# Patient Record
Sex: FEMALE | Race: BLACK OR AFRICAN AMERICAN | NOT HISPANIC OR LATINO | Employment: FULL TIME | URBAN - METROPOLITAN AREA
[De-identification: names, ages, dates, MRNs, and addresses within clinical notes are randomized per-mention and may not be internally consistent; named-entity substitution may affect disease eponyms.]

---

## 2017-05-19 LAB
B-HCG UR QL: NEGATIVE
BACTERIA #/AREA URNS HPF: ABNORMAL /HPF
BILIRUB UR QL STRIP: NEGATIVE
CLARITY UR: ABNORMAL
COLOR UR: YELLOW
CTP QC/QA: YES
GLUCOSE UR QL STRIP: NEGATIVE
HGB UR QL STRIP: ABNORMAL
HYALINE CASTS #/AREA URNS LPF: 0 /LPF
KETONES UR QL STRIP: NEGATIVE
LEUKOCYTE ESTERASE UR QL STRIP: ABNORMAL
MICROSCOPIC COMMENT: ABNORMAL
NITRITE UR QL STRIP: NEGATIVE
PH UR STRIP: 6 [PH] (ref 5–8)
PROT UR QL STRIP: ABNORMAL
RBC #/AREA URNS HPF: 7 /HPF (ref 0–4)
SP GR UR STRIP: 1.01 (ref 1–1.03)
URN SPEC COLLECT METH UR: ABNORMAL
UROBILINOGEN UR STRIP-ACNC: ABNORMAL EU/DL
WBC #/AREA URNS HPF: >100 /HPF (ref 0–5)
WBC CLUMPS URNS QL MICRO: ABNORMAL

## 2017-05-19 PROCEDURE — 87088 URINE BACTERIA CULTURE: CPT

## 2017-05-19 PROCEDURE — 87186 SC STD MICRODIL/AGAR DIL: CPT

## 2017-05-19 PROCEDURE — 96365 THER/PROPH/DIAG IV INF INIT: CPT

## 2017-05-19 PROCEDURE — 81000 URINALYSIS NONAUTO W/SCOPE: CPT

## 2017-05-19 PROCEDURE — 96361 HYDRATE IV INFUSION ADD-ON: CPT

## 2017-05-19 PROCEDURE — 87086 URINE CULTURE/COLONY COUNT: CPT

## 2017-05-19 PROCEDURE — 87077 CULTURE AEROBIC IDENTIFY: CPT

## 2017-05-19 PROCEDURE — 99284 EMERGENCY DEPT VISIT MOD MDM: CPT | Mod: 25

## 2017-05-19 PROCEDURE — 81025 URINE PREGNANCY TEST: CPT | Performed by: EMERGENCY MEDICINE

## 2017-05-19 PROCEDURE — 96375 TX/PRO/DX INJ NEW DRUG ADDON: CPT

## 2017-05-20 ENCOUNTER — HOSPITAL ENCOUNTER (EMERGENCY)
Facility: HOSPITAL | Age: 32
Discharge: HOME | End: 2017-05-20
Attending: EMERGENCY MEDICINE

## 2017-05-20 VITALS
HEIGHT: 62 IN | DIASTOLIC BLOOD PRESSURE: 119 MMHG | OXYGEN SATURATION: 100 % | HEART RATE: 79 BPM | SYSTOLIC BLOOD PRESSURE: 178 MMHG | RESPIRATION RATE: 16 BRPM | TEMPERATURE: 100 F | WEIGHT: 140 LBS | BODY MASS INDEX: 25.76 KG/M2

## 2017-05-20 DIAGNOSIS — I10 ESSENTIAL HYPERTENSION: ICD-10-CM

## 2017-05-20 DIAGNOSIS — N12 PYELONEPHRITIS: Primary | ICD-10-CM

## 2017-05-20 DIAGNOSIS — M54.9 ACUTE RIGHT-SIDED BACK PAIN, UNSPECIFIED BACK LOCATION: ICD-10-CM

## 2017-05-20 LAB
ALBUMIN SERPL BCP-MCNC: 3.6 G/DL
ALP SERPL-CCNC: 69 U/L
ALT SERPL W/O P-5'-P-CCNC: 8 U/L
ANION GAP SERPL CALC-SCNC: 9 MMOL/L
AST SERPL-CCNC: 13 U/L
BASOPHILS # BLD AUTO: 0.02 K/UL
BASOPHILS NFR BLD: 0.1 %
BILIRUB SERPL-MCNC: 0.6 MG/DL
BUN SERPL-MCNC: 11 MG/DL
CALCIUM SERPL-MCNC: 9.2 MG/DL
CHLORIDE SERPL-SCNC: 109 MMOL/L
CO2 SERPL-SCNC: 21 MMOL/L
CREAT SERPL-MCNC: 0.9 MG/DL
DIFFERENTIAL METHOD: ABNORMAL
EOSINOPHIL # BLD AUTO: 0.2 K/UL
EOSINOPHIL NFR BLD: 1.2 %
ERYTHROCYTE [DISTWIDTH] IN BLOOD BY AUTOMATED COUNT: 15.1 %
EST. GFR  (AFRICAN AMERICAN): >60 ML/MIN/1.73 M^2
EST. GFR  (NON AFRICAN AMERICAN): >60 ML/MIN/1.73 M^2
GLUCOSE SERPL-MCNC: 89 MG/DL
HCT VFR BLD AUTO: 35.9 %
HGB BLD-MCNC: 11.9 G/DL
LIPASE SERPL-CCNC: 19 U/L
LYMPHOCYTES # BLD AUTO: 1.9 K/UL
LYMPHOCYTES NFR BLD: 12.8 %
MCH RBC QN AUTO: 31.3 PG
MCHC RBC AUTO-ENTMCNC: 33.1 %
MCV RBC AUTO: 95 FL
MONOCYTES # BLD AUTO: 1.3 K/UL
MONOCYTES NFR BLD: 8.8 %
NEUTROPHILS # BLD AUTO: 11.2 K/UL
NEUTROPHILS NFR BLD: 77.1 %
PLATELET # BLD AUTO: 200 K/UL
PMV BLD AUTO: 11.7 FL
POTASSIUM SERPL-SCNC: 3.3 MMOL/L
PROT SERPL-MCNC: 7.2 G/DL
RBC # BLD AUTO: 3.8 M/UL
SODIUM SERPL-SCNC: 139 MMOL/L
WBC # BLD AUTO: 14.59 K/UL

## 2017-05-20 PROCEDURE — 80053 COMPREHEN METABOLIC PANEL: CPT

## 2017-05-20 PROCEDURE — 83690 ASSAY OF LIPASE: CPT

## 2017-05-20 PROCEDURE — 25000003 PHARM REV CODE 250: Performed by: EMERGENCY MEDICINE

## 2017-05-20 PROCEDURE — 85025 COMPLETE CBC W/AUTO DIFF WBC: CPT

## 2017-05-20 PROCEDURE — 63600175 PHARM REV CODE 636 W HCPCS: Performed by: EMERGENCY MEDICINE

## 2017-05-20 RX ORDER — ONDANSETRON 4 MG/1
4 TABLET, ORALLY DISINTEGRATING ORAL EVERY 4 HOURS PRN
Qty: 20 TABLET | Refills: 0 | Status: SHIPPED | OUTPATIENT
Start: 2017-05-20 | End: 2022-05-28

## 2017-05-20 RX ORDER — CIPROFLOXACIN 500 MG/1
500 TABLET ORAL
Status: COMPLETED | OUTPATIENT
Start: 2017-05-20 | End: 2017-05-20

## 2017-05-20 RX ORDER — KETOROLAC TROMETHAMINE 30 MG/ML
30 INJECTION, SOLUTION INTRAMUSCULAR; INTRAVENOUS
Status: COMPLETED | OUTPATIENT
Start: 2017-05-20 | End: 2017-05-20

## 2017-05-20 RX ORDER — CIPROFLOXACIN 500 MG/1
500 TABLET ORAL EVERY 12 HOURS
Qty: 28 TABLET | Refills: 0 | Status: SHIPPED | OUTPATIENT
Start: 2017-05-20 | End: 2017-06-03

## 2017-05-20 RX ORDER — KETOROLAC TROMETHAMINE 10 MG/1
10 TABLET, FILM COATED ORAL EVERY 6 HOURS PRN
Qty: 20 TABLET | Refills: 0 | Status: SHIPPED | OUTPATIENT
Start: 2017-05-20 | End: 2022-05-28

## 2017-05-20 RX ADMIN — GENTAMICIN SULFATE 276 MG: 40 INJECTION, SOLUTION INTRAMUSCULAR; INTRAVENOUS at 02:05

## 2017-05-20 RX ADMIN — SODIUM CHLORIDE 1000 ML: 0.9 INJECTION, SOLUTION INTRAVENOUS at 12:05

## 2017-05-20 RX ADMIN — KETOROLAC TROMETHAMINE 30 MG: 30 INJECTION, SOLUTION INTRAMUSCULAR at 03:05

## 2017-05-20 RX ADMIN — CIPROFLOXACIN HYDROCHLORIDE 500 MG: 500 TABLET, FILM COATED ORAL at 03:05

## 2017-05-20 NOTE — ED PROVIDER NOTES
"Encounter Date: 5/19/2017    SCRIBE #1 NOTE: I, Jayne Ochoa, am scribing for, and in the presence of,  Elmira Velazquez MD. I have scribed the following portions of the note - Other sections scribed: HPI, ROS, PE.       History     Chief Complaint   Patient presents with    Flank Pain     Pt c/o right lower flank pain radiating to her right side     Review of patient's allergies indicates:  No Known Allergies  HPI Comments: CC: Abdominal Pain    HPI: 32 year old female smoker with no pertinent PMHx presents to the ED c/o RLQ abdominal pain with associated R flank pain, lower back pain, and urinary frequency x 2 days. Pt also reports having "cloudy" urine since yesterday. Symptoms are acute onset and severe (8/10). Pain is exacerbated with movement and with deep inspiration. Pt is currently on her menses. No hx of bladder infections. No abdominal surgeries in the past. Pt denies chest pain, SOB, N/V/D, dysuria, urinary urgency, vaginal discharge, and any other associated symptoms. No prior treatment. No alleviating factors.    The history is provided by the patient. No  was used.     History reviewed. No pertinent past medical history.  History reviewed. No pertinent surgical history.  History reviewed. No pertinent family history.  Social History   Substance Use Topics    Smoking status: Current Every Day Smoker     Types: Cigarettes    Smokeless tobacco: None    Alcohol use Yes      Comment: social     Review of Systems   Constitutional: Negative for chills, diaphoresis and fever.   HENT: Negative for ear pain and sore throat.    Eyes: Negative for photophobia and visual disturbance.   Respiratory: Negative for cough and shortness of breath.    Cardiovascular: Negative for chest pain.   Gastrointestinal: Positive for abdominal pain (RLQ). Negative for diarrhea, nausea and vomiting.   Genitourinary: Positive for flank pain (right), frequency and vaginal bleeding (currently on menses). " Negative for dysuria, hematuria, urgency and vaginal discharge.        (+) cloudy urine   Musculoskeletal: Positive for back pain (lower). Negative for neck stiffness.   Skin: Negative for rash.   Neurological: Negative for headaches.       Physical Exam   Initial Vitals   BP Pulse Resp Temp SpO2   05/19/17 2058 05/19/17 2058 05/19/17 2058 05/19/17 2058 05/19/17 2058   188/109 81 18 98.3 °F (36.8 °C) 99 %     Physical Exam    Nursing note and vitals reviewed.  Constitutional: She appears well-developed and well-nourished. She is not diaphoretic.  Non-toxic appearance. She does not appear ill. No distress.   HENT:   Head: Normocephalic and atraumatic.   Mouth/Throat: Oropharynx is clear and moist.   Eyes: Conjunctivae and EOM are normal. Pupils are equal, round, and reactive to light.   Neck: Normal range of motion. Neck supple.   Cardiovascular: Normal rate, regular rhythm, normal heart sounds and intact distal pulses.   Pulmonary/Chest: Effort normal and breath sounds normal. No respiratory distress. She has no wheezes. She has no rhonchi. She has no rales.   Abdominal: Soft. Normal appearance and bowel sounds are normal. She exhibits no distension. There is tenderness in the right lower quadrant. There is no rebound, no guarding and no CVA tenderness.   Musculoskeletal: Normal range of motion. She exhibits no edema.   Neurological: She is alert and oriented to person, place, and time. She has normal strength.   Skin: Skin is warm and dry.   Psychiatric: She has a normal mood and affect.         ED Course   Procedures  Labs Reviewed   URINALYSIS - Abnormal; Notable for the following:        Result Value    Appearance, UA Cloudy (*)     Protein, UA 1+ (*)     Occult Blood UA 3+ (*)     Urobilinogen, UA 2.0-3.0 (*)     Leukocytes, UA 2+ (*)     All other components within normal limits   URINALYSIS MICROSCOPIC - Abnormal; Notable for the following:     RBC, UA 7 (*)     WBC, UA >100 (*)     WBC Clumps, UA Many (*)   "   Bacteria, UA Moderate (*)     All other components within normal limits   CBC W/ AUTO DIFFERENTIAL - Abnormal; Notable for the following:     WBC 14.59 (*)     RBC 3.80 (*)     Hemoglobin 11.9 (*)     Hematocrit 35.9 (*)     MCH 31.3 (*)     RDW 15.1 (*)     Gran # 11.2 (*)     Mono # 1.3 (*)     Gran% 77.1 (*)     Lymph% 12.8 (*)     All other components within normal limits   CULTURE, URINE   COMPREHENSIVE METABOLIC PANEL   LIPASE   POCT URINE PREGNANCY             Medical Decision Making:   History:   Old Medical Records: I decided to obtain old medical records.  Old Records Summarized: records from previous admission(s).  Initial Assessment:   This is an emergent evaluation of a 32-year-old female with right-sided flank pain, cloudy urine, and urinary frequency for 2 days.  Differential Diagnosis:   Differential includes pyelonephritis, UTI, STI, renal colic, appendicitis, other.  Independently Interpreted Test(s):   I have ordered and independently interpreted X-rays - see prior notes.  Clinical Tests:   Lab Tests: Reviewed and Ordered  Radiological Study: Reviewed and Ordered  ED Management:  The patient was bolused normal saline.  She also had Toradol for pain.    Lab work revealed a white blood cell count of 14.5 with left shift.  CMP is reassuring.  Lipase is normal.  UPT is negative.  UA shows 3+ occult blood and 2+ leukocytes.  Urine was sent for culture.    CT renal stone study showed "minimal indistinctness of the right renal collecting system" and "mild right perinephric inflammation."  This is consistent with the patient's clinical picture, which suggest pyelonephritis.    I have treated the patient with IV gentamicin here.  I have also given her oral Cipro.  She will be discharged home on oral ciprofloxacin as well as Toradol and Zofran as needed.  The patient understands her diagnosis.  She understands the need to follow-up to her primary care physician.  She will return to the emergency " department for elevated fevers, increasing pain, inability to tolerate oral intake, or other concerns.            Scribe Attestation:   Scribe #1: I performed the above scribed service and the documentation accurately describes the services I performed. I attest to the accuracy of the note.    Attending Attestation:           Physician Attestation for Scribe:  Physician Attestation Statement for Scribe #1: I, Elmira Velazquez MD, reviewed documentation, as scribed by Jayne Ochoa in my presence, and it is both accurate and complete.                 ED Course     Clinical Impression:   The primary encounter diagnosis was Pyelonephritis. Diagnoses of Acute right-sided back pain, unspecified back location and Essential hypertension were also pertinent to this visit.          Elmira Velazquez MD  05/20/17 1138

## 2017-05-20 NOTE — ED TRIAGE NOTES
Pt resents to ED with c/o of Right side and back pain. Pt states pain increases with deep cough or inspiration. Denies SOB, chest pain or N/V.

## 2017-05-20 NOTE — ED AVS SNAPSHOT
[facility]  [place] [address]               Ky Merino   2017 12:06 AM   ED    Description:  Female : 1985   Department:  [facility]           Your Care was Coordinated By:     Provider Role From To    Elmira Velazquez MD Attending Provider 17 0010 --      Reason for Visit     Flank Pain           Diagnoses this Visit        Comments    Pyelonephritis    -  Primary     Acute right-sided back pain, unspecified back location         Essential hypertension           ED Disposition     ED Disposition Condition Comment    Discharge             To Do List           Follow-up Information     Follow up with Vitaliy Alicea MD In 1 week.    Specialty:  Internal Medicine    Why:  for reeval and recheck of BP    Contact information:    [address] [place] [address] [address]  Nahum CHAKRABORTY [id number] [phone]         These Medications        Disp Refills Start End    ciprofloxacin HCl (CIPRO) 500 MG tablet 28 tablet 0 2017 6/3/2017    Take 1 tablet (500 mg total) by mouth every 12 (twelve) hours. - Oral    ketorolac (TORADOL) 10 mg tablet 20 tablet 0 2017     Take 1 tablet (10 mg total) by mouth every 6 (six) hours as needed for Pain. - Oral    ondansetron (ZOFRAN-ODT) 4 MG TbDL 20 tablet 0 2017     Take 1 tablet (4 mg total) by mouth every 4 (four) hours as needed. - Oral      [facility]     [facility] Nurse Care Line - 24/7 Assistance  Unless otherwise directed by your provider, please contact [facility], our nurse care line that is available for 24/7 assistance.     Registered nurses in the [facility] provide: appointment scheduling, clinical advisement, health education, and other advisory services.  Call: [phone] (toll free)               Medications           Message regarding Medications     Verify the changes and/or additions to your medication regime listed below are the  same as discussed with your clinician today.  If any of these changes or additions are incorrect, please notify your healthcare provider.        START taking these NEW medications        Refills    ciprofloxacin HCl (CIPRO) 500 MG tablet 0    Sig: Take 1 tablet (500 mg total) by mouth every 12 (twelve) hours.    Class: Print    Route: Oral    ketorolac (TORADOL) 10 mg tablet 0    Sig: Take 1 tablet (10 mg total) by mouth every 6 (six) hours as needed for Pain.    Class: Print    Route: Oral    ondansetron (ZOFRAN-ODT) 4 MG TbDL 0    Sig: Take 1 tablet (4 mg total) by mouth every 4 (four) hours as needed.    Class: Print    Route: Oral      These medications were administered today        Dose Freq    sodium chloride 0.9% bolus 1,000 mL 1,000 mL ED 1 Time    Sig: Inject 1,000 mLs into the vein ED 1 Time.    Class: Normal    Route: Intravenous    gentamicin (GARAMYCIN) 276 mg in sodium chloride 0.9% 100 mL IVPB 5 mg/kg × 55.5 kg (Adjusted) ED 1 Time    Sig: Inject 276 mg into the vein ED 1 Time.    Class: Normal    Route: Intravenous    ciprofloxacin HCl tablet 500 mg 500 mg ED 1 Time    Sig: Take 1 tablet (500 mg total) by mouth ED 1 Time.    Class: Normal    Route: Oral    ketorolac injection 30 mg 30 mg ED 1 Time    Sig: Inject 30 mg into the vein ED 1 Time.    Class: Normal    Route: Intravenous           Verify that the below list of medications is an accurate representation of the medications you are currently taking.  If none reported, the list may be blank. If incorrect, please contact your healthcare provider. Carry this list with you in case of emergency.           Current Medications     ciprofloxacin HCl (CIPRO) 500 MG tablet Take 1 tablet (500 mg total) by mouth every 12 (twelve) hours.    ciprofloxacin HCl tablet 500 mg Take 1 tablet (500 mg total) by mouth ED 1 Time.    ketorolac (TORADOL) 10 mg tablet Take 1 tablet (10 mg total) by mouth every 6 (six) hours as needed for Pain.    ketorolac injection 30  "mg Inject 30 mg into the vein ED 1 Time.    ondansetron (ZOFRAN-ODT) 4 MG TbDL Take 1 tablet (4 mg total) by mouth every 4 (four) hours as needed.           Clinical Reference Information           Your Vitals Were     BP Pulse Temp Resp Height Weight    197/121 78 98.1 °F (36.7 °C) (Oral) 16 5' 2" (1.575 m) 63.5 kg (140 lb)    Last Period SpO2 BMI          05/18/2017 (Approximate) 99% 25.61 kg/m2        Allergies as of 5/20/2017     No Known Allergies      Immunizations Administered on Date of Encounter - 5/20/2017     None      ED Micro, Lab, POCT     Start Ordered       Status Ordering Provider    05/20/17 0011 05/20/17 0010  Comprehensive metabolic panel  STAT      Final result     05/20/17 0011 05/20/17 0010  CBC auto differential  STAT      Final result     05/20/17 0011 05/20/17 0010  Lipase  STAT      Final result     05/19/17 2101 05/19/17 2100  POCT urine pregnancy  Once      Final result     05/19/17 2101 05/19/17 2100  Urinalysis  STAT      Final result     05/19/17 2101 05/19/17 2100  Urine culture **CANNOT BE ORDERED STAT**  Once      In process     05/19/17 2100 05/19/17 2100  Urinalysis Microscopic  Once      Final result       ED Imaging Orders     Start Ordered       Status Ordering Provider    05/20/17 0159 05/20/17 0159  CT Renal Stone Study ABD Pelvis WO  1 time imaging      Final result       Discharge References/Attachments     PYELONEPHRITIS, FEMALE (ADULT) (ENGLISH)      [facility] Sign-Up     Activating your [facility] account is as easy as 1-2-3!     1) Visit [email or web address], select Sign Up Now, enter this activation code and your date of birth, then select Next.  [email or web address]  Expires: 7/4/2017  2:00 AM      2) Create a username and password to use when you visit [facility] in the future and select a security question in case you lose your password and select Next.    3) Enter your e-mail address and click Sign Up!    Additional Information  If you have questions, please e-mail " [email or web address] or call [phone] to talk to our [facility] staff. Remember, [employer] is NOT to be used for urgent needs. For medical emergencies, dial 911.         Smoking Cessation     If you would like to quit smoking:   You may be eligible for free services if you are a [place] resident and started smoking cigarettes before September 1, 1988.  Call the Smoking Cessation Trust ([facility]) toll free at (348) 218-1400 or (459) [phone].   Call 3-094-QUIT-NOW if you do not meet the above criteria.   Contact us via email: [email or web address]   View our website for more information: [email or web address]         [facility] complies with applicable Federal civil rights laws and does not discriminate on the basis of race, color, national origin, age, disability, or sex.        Language Assistance Services     ATTENTION: Language assistance services are available, free of charge. Please call [phone].      ATENCIÓN: Si habla español, tiene a edmond disposición servicios gratuitos de asistencia lingüística. Llame al [phone].     CHÚ Ý: N?u b?n nói Ti?ng Vi?t, có các d?ch v? h? tr? ngôn ng? mi?n phí dành cho b?n. G?i s? [phone].

## 2017-05-21 LAB — BACTERIA UR CULT: NORMAL

## 2021-03-28 ENCOUNTER — HOSPITAL ENCOUNTER (EMERGENCY)
Facility: HOSPITAL | Age: 36
Discharge: HOME | End: 2021-03-28
Attending: EMERGENCY MEDICINE

## 2021-03-28 VITALS
BODY MASS INDEX: 24.84 KG/M2 | OXYGEN SATURATION: 99 % | HEIGHT: 62 IN | TEMPERATURE: 98 F | SYSTOLIC BLOOD PRESSURE: 148 MMHG | DIASTOLIC BLOOD PRESSURE: 95 MMHG | HEART RATE: 82 BPM | RESPIRATION RATE: 18 BRPM | WEIGHT: 135 LBS
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
## 2021-03-28 DIAGNOSIS — S01.81XA FACIAL LACERATION, INITIAL ENCOUNTER: ICD-10-CM

## 2021-03-28 PROCEDURE — 63600175 PHARM REV CODE 636 W HCPCS: Performed by: EMERGENCY MEDICINE

## 2021-03-28 PROCEDURE — 90715 TDAP VACCINE 7 YRS/> IM: CPT | Performed by: EMERGENCY MEDICINE

## 2021-03-28 PROCEDURE — 99284 EMERGENCY DEPT VISIT MOD MDM: CPT | Mod: 25

## 2021-03-28 PROCEDURE — 90471 IMMUNIZATION ADMIN: CPT | Performed by: EMERGENCY MEDICINE

## 2021-03-28 PROCEDURE — 25000003 PHARM REV CODE 250: Performed by: EMERGENCY MEDICINE

## 2021-03-28 PROCEDURE — 12002 RPR S/N/AX/GEN/TRNK2.6-7.5CM: CPT

## 2021-03-28 RX ORDER — IBUPROFEN 600 MG/1
600 TABLET ORAL EVERY 6 HOURS PRN
Qty: 20 TABLET | Refills: 0 | Status: SHIPPED | OUTPATIENT
Start: 2021-03-28

## 2021-03-28 RX ORDER — HYDROCODONE BITARTRATE AND ACETAMINOPHEN 5; 325 MG/1; MG/1
1 TABLET ORAL
Status: COMPLETED | OUTPATIENT
Start: 2021-03-28 | End: 2021-03-28

## 2021-03-28 RX ORDER — LIDOCAINE HYDROCHLORIDE AND EPINEPHRINE 10; 10 MG/ML; UG/ML
10 INJECTION, SOLUTION INFILTRATION; PERINEURAL ONCE
Status: COMPLETED | OUTPATIENT
Start: 2021-03-28 | End: 2021-03-28

## 2021-03-28 RX ORDER — BACITRACIN ZINC 500 UNIT/G
OINTMENT (GRAM) TOPICAL 2 TIMES DAILY
Qty: 30 G | Refills: 0 | Status: SHIPPED | OUTPATIENT
Start: 2021-03-28 | End: 2022-05-28

## 2021-03-28 RX ORDER — BACITRACIN ZINC 500 UNIT/G
OINTMENT (GRAM) TOPICAL 2 TIMES DAILY
Qty: 30 G | Refills: 0 | COMMUNITY
Start: 2021-03-28 | End: 2022-05-28

## 2021-03-28 RX ORDER — ACETAMINOPHEN 500 MG
1000 TABLET ORAL EVERY 6 HOURS PRN
Qty: 60 TABLET | Refills: 0 | Status: SHIPPED | OUTPATIENT
Start: 2021-03-28 | End: 2022-06-19 | Stop reason: ALTCHOICE

## 2021-03-28 RX ADMIN — HYDROCODONE BITARTRATE AND ACETAMINOPHEN 1 TABLET: 5; 325 TABLET ORAL at 01:03

## 2021-03-28 RX ADMIN — BACITRACIN, NEOMYCIN, POLYMYXIN B 1 EACH: 400; 3.5; 5 OINTMENT TOPICAL at 02:03

## 2021-03-28 RX ADMIN — LIDOCAINE HYDROCHLORIDE,EPINEPHRINE BITARTRATE 10 ML: 10; .01 INJECTION, SOLUTION INFILTRATION; PERINEURAL at 01:03

## 2021-03-28 RX ADMIN — LIDOCAINE-EPINEPHRINE-TETRACAINE GEL 4-0.05-0.5%: 4-0.05-0.5 GEL at 01:03

## 2021-03-28 RX ADMIN — CLOSTRIDIUM TETANI TOXOID ANTIGEN (FORMALDEHYDE INACTIVATED), CORYNEBACTERIUM DIPHTHERIAE TOXOID ANTIGEN (FORMALDEHYDE INACTIVATED), BORDETELLA PERTUSSIS TOXOID ANTIGEN (GLUTARALDEHYDE INACTIVATED), BORDETELLA PERTUSSIS FILAMENTOUS HEMAGGLUTININ ANTIGEN (FORMALDEHYDE INACTIVATED), BORDETELLA PERTUSSIS PERTACTIN ANTIGEN, AND BORDETELLA PERTUSSIS FIMBRIAE 2/3 ANTIGEN 0.5 ML: 5; 2; 2.5; 5; 3; 5 INJECTION, SUSPENSION INTRAMUSCULAR at 01:03

## 2022-05-28 ENCOUNTER — HOSPITAL ENCOUNTER (EMERGENCY)
Facility: HOSPITAL | Age: 37
Discharge: HOME | End: 2022-05-28
Attending: EMERGENCY MEDICINE
Payer: MEDICAID

## 2022-05-28 VITALS
TEMPERATURE: 98 F | RESPIRATION RATE: 18 BRPM | WEIGHT: 105 LBS | DIASTOLIC BLOOD PRESSURE: 82 MMHG | HEART RATE: 89 BPM | OXYGEN SATURATION: 100 % | SYSTOLIC BLOOD PRESSURE: 120 MMHG | BODY MASS INDEX: 19.32 KG/M2 | HEIGHT: 62 IN

## 2022-05-28 DIAGNOSIS — U07.1 COVID: Primary | ICD-10-CM

## 2022-05-28 LAB
B-HCG UR QL: NEGATIVE
CTP QC/QA: YES
MOLECULAR STREP A: NEGATIVE
POC MOLECULAR INFLUENZA A AGN: NEGATIVE
POC MOLECULAR INFLUENZA B AGN: NEGATIVE
SARS-COV-2 RDRP RESP QL NAA+PROBE: POSITIVE

## 2022-05-28 PROCEDURE — 99282 EMERGENCY DEPT VISIT SF MDM: CPT

## 2022-05-28 PROCEDURE — 87502 INFLUENZA DNA AMP PROBE: CPT

## 2022-05-28 PROCEDURE — 81025 URINE PREGNANCY TEST: CPT | Performed by: EMERGENCY MEDICINE

## 2022-05-28 PROCEDURE — U0002 COVID-19 LAB TEST NON-CDC: HCPCS | Performed by: EMERGENCY MEDICINE

## 2022-05-28 RX ORDER — GUAIFENESIN/DEXTROMETHORPHAN 100-10MG/5
10 SYRUP ORAL 4 TIMES DAILY PRN
Qty: 120 ML | Refills: 0 | Status: SHIPPED | OUTPATIENT
Start: 2022-05-28 | End: 2022-06-07

## 2022-05-28 RX ORDER — CETIRIZINE HYDROCHLORIDE 10 MG/1
10 TABLET ORAL DAILY PRN
Qty: 10 TABLET | Refills: 0 | Status: SHIPPED | OUTPATIENT
Start: 2022-05-28 | End: 2023-05-28

## 2022-05-28 RX ORDER — FLUTICASONE PROPIONATE 50 MCG
2 SPRAY, SUSPENSION (ML) NASAL DAILY PRN
Qty: 9.9 ML | Refills: 0 | Status: SHIPPED | OUTPATIENT
Start: 2022-05-28

## 2022-05-28 NOTE — Clinical Note
"Ky MORAN"Ky Merino was seen and treated in our emergency department on 5/28/2022.     COVID-19 is present in our communities across the state. There is limited testing for COVID at this time, so not all patients can be tested. In this situation, your employee meets the following criteria:    Ky Merino has met the criteria for COVID-19 testing and has a POSITIVE result. She can return to work once they are asymptomatic for 24 hours without the use of fever reducing medications AND at least five days from the first positive result. A mask is recommended for 5 days post quarantine.     If you have any questions or concerns, or if I can be of further assistance, please do not hesitate to contact me.    Sincerely,             Lizandro Simon PA-C"

## 2022-05-29 NOTE — ED TRIAGE NOTES
Pt to the ED with complaints of sore throat, chills, and congestion since yesterday. Pt denies headache, shortness of breath, chest pain, N/V/D.

## 2022-05-29 NOTE — ED PROVIDER NOTES
Encounter Date: 5/28/2022       History     Chief Complaint   Patient presents with    Chills    Fever    Sore Throat    Nasal Congestion     Pt presents to ED c/o sore throat, chills, fever and nasal congestion x 1 day.  Denies cp, sob, ha, n/v/d, or cough.  Pt reports taking OTC medication with moderate relief.  Pain 0/10.      38yo F smoker with chief complaint fever, chills, odynophagia, nasal congestion, mild rhinorrhea, all x today at 1am.    Denies any recent illness.  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  She admits to very mild cough.  No shortness of breath.  No neck pain or stiffness.  Symptoms are acute, constant, mild.  Denies any alleviating or exacerbating factors.  No radiation symptoms.  No meds taken prior to arrival.        Review of patient's allergies indicates:  No Known Allergies  History reviewed. No pertinent past medical history.  History reviewed. No pertinent surgical history.  History reviewed. No pertinent family history.  Social History     Tobacco Use    Smoking status: Current Every Day Smoker     Types: Cigarettes    Smokeless tobacco: Never Used   Substance Use Topics    Alcohol use: Yes     Comment: social    Drug use: No     Review of Systems   Constitutional: Positive for appetite change, chills, fatigue and fever.   HENT: Positive for rhinorrhea and sore throat.    Respiratory: Positive for cough. Negative for shortness of breath.    Gastrointestinal: Negative for abdominal pain, diarrhea, nausea and vomiting.   Musculoskeletal: Negative for myalgias, neck pain and neck stiffness.       Physical Exam     Initial Vitals [05/28/22 2138]   BP Pulse Resp Temp SpO2   125/88 95 18 98.3 °F (36.8 °C) 99 %      MAP       --         Physical Exam    Constitutional: She appears well-developed and well-nourished. She is not diaphoretic. No distress.   HENT:   Head: Normocephalic and atraumatic.   Nasal congestion, turbinate edema   Neck: Neck supple.   Normal  range of motion.  Cardiovascular: Intact distal pulses.   Pulmonary/Chest: Breath sounds normal. No respiratory distress.   Musculoskeletal:      Cervical back: Normal range of motion and neck supple.     Neurological: She is alert and oriented to person, place, and time. GCS score is 15. GCS eye subscore is 4. GCS verbal subscore is 5. GCS motor subscore is 6.   Skin: Skin is warm. Capillary refill takes less than 2 seconds.   Psychiatric: She has a normal mood and affect. Thought content normal.         ED Course   Procedures  Labs Reviewed   SARS-COV-2 RDRP GENE - Abnormal; Notable for the following components:       Result Value    POC Rapid COVID Positive (*)     All other components within normal limits   POCT URINE PREGNANCY   POCT STREP A MOLECULAR   POCT INFLUENZA A/B MOLECULAR          Imaging Results    None          Medications - No data to display  Medical Decision Making:   Differential Diagnosis:   Viral illness, pneumonia, bronchitis, pharyngitis  ED Management:  COVID positive, consistent with presentation.  Denies severe cough, however there is mild nonproductive cough during exam.  Overall well-appearing nontoxic.  Vitals reassuring.  Denies shortness of breath.  Lungs clear.  No hypoxia.  Continue supportive treatment, follow up as an outpatient.  Return precautions given.                      Clinical Impression:   Final diagnoses:  [U07.1] COVID (Primary)          ED Disposition Condition    Discharge Stable        ED Prescriptions     Medication Sig Dispense Start Date End Date Auth. Provider    fluticasone propionate (FLONASE) 50 mcg/actuation nasal spray 2 sprays (100 mcg total) by Each Nostril route daily as needed (Nasal congestion). 9.9 mL 5/28/2022  Lizandro Simon PA-C    dextromethorphan-guaiFENesin  mg/5 ml (ROBITUSSIN-DM)  mg/5 mL liquid Take 10 mLs by mouth 4 (four) times daily as needed (Cough). 120 mL 5/28/2022 6/7/2022 Lizandro Simon PA-C    cetirizine (ZYRTEC)  10 MG tablet Take 1 tablet (10 mg total) by mouth daily as needed (Nasal congestion). 10 tablet 5/28/2022 5/28/2023 Lizandro Simon PA-C        Follow-up Information     Follow up With Specialties Details Why Contact Info    Primary care provider  Schedule an appointment as soon as possible for a visit  For reevaluation, If symptoms persist            Lizandro Simon PA-C  05/28/22 6920

## 2022-05-29 NOTE — DISCHARGE INSTRUCTIONS
Drink lots of fluids, stay well hydrated. Tylenol/Ibuprofen as needed for discomfort; go back and forth between these two medications every 4 hrs as needed for temp greater than or equal to 100.4F, as needed for congestion/headache/body aches. Flonase for congestion. Robitussin for cough.     Follow-up with primary care provider for reevaluation, further recommendations. Return to this ED if unable to treat fever, if symptoms persist or worsen despite treatment, if you begin with shortness of breath or difficulty breathing, if any other problems occur.

## 2022-06-19 ENCOUNTER — HOSPITAL ENCOUNTER (EMERGENCY)
Facility: HOSPITAL | Age: 37
Discharge: HOME | End: 2022-06-19
Attending: STUDENT IN AN ORGANIZED HEALTH CARE EDUCATION/TRAINING PROGRAM
Payer: MEDICAID

## 2022-06-19 VITALS
BODY MASS INDEX: 24.84 KG/M2 | RESPIRATION RATE: 13 BRPM | HEIGHT: 62 IN | TEMPERATURE: 99 F | WEIGHT: 135 LBS | SYSTOLIC BLOOD PRESSURE: 143 MMHG | HEART RATE: 76 BPM | OXYGEN SATURATION: 100 % | DIASTOLIC BLOOD PRESSURE: 85 MMHG

## 2022-06-19 DIAGNOSIS — R51.9 HEADACHE IN FRONT OF HEAD: ICD-10-CM

## 2022-06-19 DIAGNOSIS — I10 UNCONTROLLED HYPERTENSION: ICD-10-CM

## 2022-06-19 DIAGNOSIS — R11.2 NON-INTRACTABLE VOMITING WITH NAUSEA, UNSPECIFIED VOMITING TYPE: ICD-10-CM
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
## 2022-06-19 DIAGNOSIS — R00.0 TACHYCARDIA: ICD-10-CM

## 2022-06-19 LAB
ALBUMIN SERPL BCP-MCNC: 4.1 G/DL (ref 3.5–5.2)
ALP SERPL-CCNC: 77 U/L (ref 55–135)
ALT SERPL W/O P-5'-P-CCNC: 10 U/L (ref 10–44)
AMPHET+METHAMPHET UR QL: ABNORMAL
ANION GAP SERPL CALC-SCNC: 12 MMOL/L (ref 8–16)
AST SERPL-CCNC: 19 U/L (ref 10–40)
B-HCG UR QL: NEGATIVE
BARBITURATES UR QL SCN>200 NG/ML: NEGATIVE
BASOPHILS # BLD AUTO: 0.03 K/UL (ref 0–0.2)
BASOPHILS NFR BLD: 0.2 % (ref 0–1.9)
BENZODIAZ UR QL SCN>200 NG/ML: NEGATIVE
BILIRUB SERPL-MCNC: 0.2 MG/DL (ref 0.1–1)
BILIRUB UR QL STRIP: NEGATIVE
BUN SERPL-MCNC: 22 MG/DL (ref 6–20)
BZE UR QL SCN: NEGATIVE
CALCIUM SERPL-MCNC: 9.5 MG/DL (ref 8.7–10.5)
CANNABINOIDS UR QL SCN: ABNORMAL
CHLORIDE SERPL-SCNC: 108 MMOL/L (ref 95–110)
CLARITY UR: CLEAR
CO2 SERPL-SCNC: 21 MMOL/L (ref 23–29)
COLOR UR: YELLOW
CREAT SERPL-MCNC: 1 MG/DL (ref 0.5–1.4)
CREAT UR-MCNC: 88.5 MG/DL (ref 15–325)
CTP QC/QA: YES
D DIMER PPP IA.FEU-MCNC: 0.41 MG/L FEU
DIFFERENTIAL METHOD: ABNORMAL
EOSINOPHIL # BLD AUTO: 0.1 K/UL (ref 0–0.5)
EOSINOPHIL NFR BLD: 0.4 % (ref 0–8)
ERYTHROCYTE [DISTWIDTH] IN BLOOD BY AUTOMATED COUNT: 19.9 % (ref 11.5–14.5)
EST. GFR  (AFRICAN AMERICAN): >60 ML/MIN/1.73 M^2
EST. GFR  (NON AFRICAN AMERICAN): >60 ML/MIN/1.73 M^2
GLUCOSE SERPL-MCNC: 109 MG/DL (ref 70–110)
GLUCOSE UR QL STRIP: NEGATIVE
HCT VFR BLD AUTO: 29.2 % (ref 37–48.5)
HGB BLD-MCNC: 8.8 G/DL (ref 12–16)
HGB UR QL STRIP: NEGATIVE
IMM GRANULOCYTES # BLD AUTO: 0.04 K/UL (ref 0–0.04)
IMM GRANULOCYTES NFR BLD AUTO: 0.3 % (ref 0–0.5)
KETONES UR QL STRIP: NEGATIVE
LEUKOCYTE ESTERASE UR QL STRIP: NEGATIVE
LYMPHOCYTES # BLD AUTO: 1.3 K/UL (ref 1–4.8)
LYMPHOCYTES NFR BLD: 10.4 % (ref 18–48)
MAGNESIUM SERPL-MCNC: 1.7 MG/DL (ref 1.6–2.6)
MCH RBC QN AUTO: 23.1 PG (ref 27–31)
MCHC RBC AUTO-ENTMCNC: 30.1 G/DL (ref 32–36)
MCV RBC AUTO: 77 FL (ref 82–98)
METHADONE UR QL SCN>300 NG/ML: NEGATIVE
MONOCYTES # BLD AUTO: 0.7 K/UL (ref 0.3–1)
MONOCYTES NFR BLD: 5.8 % (ref 4–15)
NEUTROPHILS # BLD AUTO: 10.2 K/UL (ref 1.8–7.7)
NEUTROPHILS NFR BLD: 82.9 % (ref 38–73)
NITRITE UR QL STRIP: NEGATIVE
NRBC BLD-RTO: 0 /100 WBC
OPIATES UR QL SCN: NEGATIVE
PCP UR QL SCN>25 NG/ML: NEGATIVE
PH UR STRIP: 6 [PH] (ref 5–8)
PHOSPHATE SERPL-MCNC: 1.8 MG/DL (ref 2.7–4.5)
PLATELET # BLD AUTO: 283 K/UL (ref 150–450)
PMV BLD AUTO: 10.7 FL (ref 9.2–12.9)
POTASSIUM SERPL-SCNC: 3.7 MMOL/L (ref 3.5–5.1)
PROT SERPL-MCNC: 7.9 G/DL (ref 6–8.4)
PROT UR QL STRIP: ABNORMAL
RBC # BLD AUTO: 3.81 M/UL (ref 4–5.4)
SODIUM SERPL-SCNC: 141 MMOL/L (ref 136–145)
SP GR UR STRIP: 1.01 (ref 1–1.03)
TOXICOLOGY INFORMATION: ABNORMAL
TSH SERPL DL<=0.005 MIU/L-ACNC: 1.33 UIU/ML (ref 0.4–4)
URN SPEC COLLECT METH UR: ABNORMAL
UROBILINOGEN UR STRIP-ACNC: NEGATIVE EU/DL
WBC # BLD AUTO: 12.33 K/UL (ref 3.9–12.7)

## 2022-06-19 PROCEDURE — 63600175 PHARM REV CODE 636 W HCPCS: Performed by: EMERGENCY MEDICINE

## 2022-06-19 PROCEDURE — 25000003 PHARM REV CODE 250: Performed by: EMERGENCY MEDICINE

## 2022-06-19 PROCEDURE — 81025 URINE PREGNANCY TEST: CPT | Performed by: STUDENT IN AN ORGANIZED HEALTH CARE EDUCATION/TRAINING PROGRAM

## 2022-06-19 PROCEDURE — 85379 FIBRIN DEGRADATION QUANT: CPT | Performed by: STUDENT IN AN ORGANIZED HEALTH CARE EDUCATION/TRAINING PROGRAM

## 2022-06-19 PROCEDURE — 84443 ASSAY THYROID STIM HORMONE: CPT | Performed by: STUDENT IN AN ORGANIZED HEALTH CARE EDUCATION/TRAINING PROGRAM

## 2022-06-19 PROCEDURE — 81003 URINALYSIS AUTO W/O SCOPE: CPT | Mod: 59 | Performed by: STUDENT IN AN ORGANIZED HEALTH CARE EDUCATION/TRAINING PROGRAM

## 2022-06-19 PROCEDURE — 93010 EKG 12-LEAD: ICD-10-PCS | Mod: ,,, | Performed by: INTERNAL MEDICINE

## 2022-06-19 PROCEDURE — 96375 TX/PRO/DX INJ NEW DRUG ADDON: CPT

## 2022-06-19 PROCEDURE — 96361 HYDRATE IV INFUSION ADD-ON: CPT

## 2022-06-19 PROCEDURE — 85025 COMPLETE CBC W/AUTO DIFF WBC: CPT | Performed by: STUDENT IN AN ORGANIZED HEALTH CARE EDUCATION/TRAINING PROGRAM

## 2022-06-19 PROCEDURE — 99285 EMERGENCY DEPT VISIT HI MDM: CPT | Mod: 25

## 2022-06-19 PROCEDURE — 25000003 PHARM REV CODE 250: Performed by: STUDENT IN AN ORGANIZED HEALTH CARE EDUCATION/TRAINING PROGRAM

## 2022-06-19 PROCEDURE — 80053 COMPREHEN METABOLIC PANEL: CPT | Performed by: STUDENT IN AN ORGANIZED HEALTH CARE EDUCATION/TRAINING PROGRAM

## 2022-06-19 PROCEDURE — 93010 ELECTROCARDIOGRAM REPORT: CPT | Mod: ,,, | Performed by: INTERNAL MEDICINE

## 2022-06-19 PROCEDURE — 80307 DRUG TEST PRSMV CHEM ANLYZR: CPT | Performed by: EMERGENCY MEDICINE

## 2022-06-19 PROCEDURE — 84100 ASSAY OF PHOSPHORUS: CPT | Performed by: STUDENT IN AN ORGANIZED HEALTH CARE EDUCATION/TRAINING PROGRAM

## 2022-06-19 PROCEDURE — 96374 THER/PROPH/DIAG INJ IV PUSH: CPT

## 2022-06-19 PROCEDURE — 83735 ASSAY OF MAGNESIUM: CPT | Performed by: STUDENT IN AN ORGANIZED HEALTH CARE EDUCATION/TRAINING PROGRAM

## 2022-06-19 PROCEDURE — 93005 ELECTROCARDIOGRAM TRACING: CPT

## 2022-06-19 PROCEDURE — 96376 TX/PRO/DX INJ SAME DRUG ADON: CPT

## 2022-06-19 RX ORDER — CLONIDINE HYDROCHLORIDE 0.1 MG/1
0.1 TABLET ORAL
Status: COMPLETED | OUTPATIENT
Start: 2022-06-19 | End: 2022-06-19

## 2022-06-19 RX ORDER — CLONIDINE HYDROCHLORIDE 0.1 MG/1
0.1 TABLET ORAL
Status: DISCONTINUED | OUTPATIENT
Start: 2022-06-19 | End: 2022-06-19

## 2022-06-19 RX ORDER — ONDANSETRON 4 MG/1
4 TABLET, ORALLY DISINTEGRATING ORAL
Status: DISCONTINUED | OUTPATIENT
Start: 2022-06-19 | End: 2022-06-19

## 2022-06-19 RX ORDER — HYDRALAZINE HYDROCHLORIDE 20 MG/ML
20 INJECTION INTRAMUSCULAR; INTRAVENOUS
Status: COMPLETED | OUTPATIENT
Start: 2022-06-19 | End: 2022-06-19

## 2022-06-19 RX ORDER — HYDRALAZINE HYDROCHLORIDE 20 MG/ML
20 INJECTION INTRAMUSCULAR; INTRAVENOUS
Status: DISCONTINUED | OUTPATIENT
Start: 2022-06-19 | End: 2022-06-19

## 2022-06-19 RX ORDER — HYDRALAZINE HYDROCHLORIDE 20 MG/ML
10 INJECTION INTRAMUSCULAR; INTRAVENOUS
Status: COMPLETED | OUTPATIENT
Start: 2022-06-19 | End: 2022-06-19

## 2022-06-19 RX ORDER — ACETAMINOPHEN 325 MG/1
650 TABLET ORAL
Status: COMPLETED | OUTPATIENT
Start: 2022-06-19 | End: 2022-06-19

## 2022-06-19 RX ORDER — ONDANSETRON 4 MG/1
4 TABLET, FILM COATED ORAL EVERY 8 HOURS PRN
Qty: 12 TABLET | Refills: 0 | Status: SHIPPED | OUTPATIENT
Start: 2022-06-19

## 2022-06-19 RX ORDER — METOPROLOL TARTRATE 50 MG/1
50 TABLET ORAL
Status: COMPLETED | OUTPATIENT
Start: 2022-06-19 | End: 2022-06-19

## 2022-06-19 RX ORDER — METOPROLOL SUCCINATE 50 MG/1
50 TABLET, EXTENDED RELEASE ORAL DAILY
Qty: 30 TABLET | Refills: 11 | Status: SHIPPED | OUTPATIENT
Start: 2022-06-19 | End: 2023-06-19

## 2022-06-19 RX ORDER — BUTALBITAL, ACETAMINOPHEN AND CAFFEINE 50; 325; 40 MG/1; MG/1; MG/1
1 TABLET ORAL EVERY 4 HOURS PRN
Qty: 20 TABLET | Refills: 0 | Status: SHIPPED | OUTPATIENT
Start: 2022-06-19 | End: 2022-07-19

## 2022-06-19 RX ORDER — DIPHENHYDRAMINE HYDROCHLORIDE 50 MG/ML
50 INJECTION INTRAMUSCULAR; INTRAVENOUS
Status: COMPLETED | OUTPATIENT
Start: 2022-06-19 | End: 2022-06-19

## 2022-06-19 RX ORDER — PROCHLORPERAZINE EDISYLATE 5 MG/ML
10 INJECTION INTRAMUSCULAR; INTRAVENOUS
Status: DISCONTINUED | OUTPATIENT
Start: 2022-06-19 | End: 2022-06-19 | Stop reason: HOSPADM

## 2022-06-19 RX ADMIN — HYDRALAZINE HYDROCHLORIDE 20 MG: 20 INJECTION INTRAMUSCULAR; INTRAVENOUS at 05:06

## 2022-06-19 RX ADMIN — DIPHENHYDRAMINE HYDROCHLORIDE 50 MG: 50 INJECTION INTRAMUSCULAR; INTRAVENOUS at 10:06

## 2022-06-19 RX ADMIN — METOPROLOL TARTRATE 50 MG: 50 TABLET, FILM COATED ORAL at 10:06

## 2022-06-19 RX ADMIN — SODIUM CHLORIDE 1000 ML: 0.9 INJECTION, SOLUTION INTRAVENOUS at 01:06

## 2022-06-19 RX ADMIN — ACETAMINOPHEN 650 MG: 325 TABLET ORAL at 12:06

## 2022-06-19 RX ADMIN — CLONIDINE HYDROCHLORIDE 0.1 MG: 0.1 TABLET ORAL at 07:06

## 2022-06-19 RX ADMIN — HYDRALAZINE HYDROCHLORIDE 10 MG: 20 INJECTION INTRAMUSCULAR; INTRAVENOUS at 07:06
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
## 2022-06-19 NOTE — DISCHARGE INSTRUCTIONS
Zofran for nausea.  Tylenol or Fioricet for headache, not both.  Metoprolol XL 50 for your blood pressure.  Return immediately if you get worse or if new problems develop.  Please follow-up with your primary care doctor, the primary care doctor above this week.  Return immediately if you get worse or if new problems develop.

## 2023-10-05 ENCOUNTER — HOSPITAL ENCOUNTER (EMERGENCY)
Facility: HOSPITAL | Age: 38
Discharge: HOME | End: 2023-10-05
Attending: EMERGENCY MEDICINE
Payer: MEDICAID

## 2023-10-05 VITALS
TEMPERATURE: 99 F | OXYGEN SATURATION: 96 % | HEIGHT: 62 IN | SYSTOLIC BLOOD PRESSURE: 175 MMHG | DIASTOLIC BLOOD PRESSURE: 92 MMHG | WEIGHT: 140 LBS | HEART RATE: 92 BPM | BODY MASS INDEX: 25.76 KG/M2 | RESPIRATION RATE: 16 BRPM

## 2023-10-05 DIAGNOSIS — B34.9 VIRAL SYNDROME: Primary | ICD-10-CM

## 2023-10-05 LAB
B-HCG UR QL: NEGATIVE
CTP QC/QA: YES
MOLECULAR STREP A: NEGATIVE
POC MOLECULAR INFLUENZA A AGN: NEGATIVE
POC MOLECULAR INFLUENZA B AGN: NEGATIVE
SARS-COV-2 RDRP RESP QL NAA+PROBE: NEGATIVE

## 2023-10-05 PROCEDURE — 87635 SARS-COV-2 COVID-19 AMP PRB: CPT | Performed by: PHYSICIAN ASSISTANT

## 2023-10-05 PROCEDURE — 87502 INFLUENZA DNA AMP PROBE: CPT

## 2023-10-05 PROCEDURE — 99283 EMERGENCY DEPT VISIT LOW MDM: CPT

## 2023-10-05 PROCEDURE — 81025 URINE PREGNANCY TEST: CPT

## 2023-10-05 PROCEDURE — 87651 STREP A DNA AMP PROBE: CPT

## 2023-10-05 RX ORDER — ACETAMINOPHEN 500 MG
500 TABLET ORAL EVERY 4 HOURS PRN
Qty: 30 TABLET | Refills: 0 | Status: SHIPPED | OUTPATIENT
Start: 2023-10-05

## 2023-10-05 RX ORDER — PHENOL 1.4 %
AEROSOL, SPRAY (ML) MUCOUS MEMBRANE
Qty: 177 ML | Refills: 0 | Status: SHIPPED | OUTPATIENT
Start: 2023-10-05

## 2023-10-05 RX ORDER — IBUPROFEN 400 MG/1
400 TABLET ORAL EVERY 6 HOURS PRN
Qty: 30 TABLET | Refills: 0 | Status: SHIPPED | OUTPATIENT
Start: 2023-10-05

## 2023-10-05 RX ORDER — GUAIFENESIN 100 MG/5ML
200 SOLUTION ORAL EVERY 4 HOURS PRN
Qty: 118 ML | Refills: 0 | Status: SHIPPED | OUTPATIENT
Start: 2023-10-05 | End: 2023-10-15

## 2023-10-05 NOTE — Clinical Note
"Ky Law" Wilber was seen and treated in our emergency department on 10/5/2023.  She may return to work on 10/07/2023.       If you have any questions or concerns, please don't hesitate to call.      Anni Johnson PA-C"

## 2023-10-05 NOTE — Clinical Note
"Ky Law" Wilber was seen and treated in our emergency department on 10/5/2023.  She may return to work on 10/06/2023.       If you have any questions or concerns, please don't hesitate to call.      Anni Johnson PA-C"

## 2023-10-06 NOTE — DISCHARGE INSTRUCTIONS

## 2023-10-06 NOTE — ED PROVIDER NOTES
Encounter Date: 10/5/2023       History     Chief Complaint   Patient presents with    Cough     Cough, chills, fever x 3 days. Denies meds pta.      38-year-old female with no past medical history presents to ED complaining of nonproductive cough, chills, rhinorrhea, sore throat, generalized myalgias that started 3 days ago.  Patient denies any sick contacts.  She attempted NyQuil yesterday with mild relief.  Her last menstrual period was 09/15/2023.  She denies any fever, dysphagia, nasal congestion, chest pain, shortness of breath, abdominal pain, nausea vomiting, diarrhea, dysuria, hematuria.  No other symptoms reported.    The history is provided by the patient. No  was used.     Review of patient's allergies indicates:  No Known Allergies  No past medical history on file.  No past surgical history on file.  No family history on file.  Social History     Tobacco Use    Smoking status: Every Day     Types: Cigarettes    Smokeless tobacco: Never   Substance Use Topics    Alcohol use: Yes     Comment: social    Drug use: No     Review of Systems   Constitutional:  Positive for chills. Negative for fever.   HENT:  Positive for rhinorrhea and sore throat. Negative for congestion, ear pain and trouble swallowing.    Eyes:  Negative for redness.   Respiratory:  Positive for cough (nonproductive). Negative for shortness of breath.    Cardiovascular:  Negative for chest pain.   Gastrointestinal:  Negative for abdominal pain, diarrhea, nausea and vomiting.   Genitourinary:  Negative for decreased urine volume, difficulty urinating, dysuria, frequency, hematuria and urgency.   Musculoskeletal:  Positive for myalgias. Negative for back pain and neck pain.   Skin:  Negative for rash.   Neurological:  Negative for headaches.   Psychiatric/Behavioral:  Negative for confusion.        Physical Exam     Initial Vitals [10/05/23 1854]   BP Pulse Resp Temp SpO2   (!) 183/93 103 16 98.6 °F (37 °C) 97 %      MAP        --         Physical Exam    Nursing note and vitals reviewed.  Constitutional: She appears well-developed and well-nourished.  Non-toxic appearance. She does not appear ill.   HENT:   Head: Normocephalic and atraumatic.   Right Ear: Hearing, tympanic membrane, external ear and ear canal normal. Tympanic membrane is not perforated, not erythematous and not bulging.   Left Ear: Hearing, tympanic membrane, external ear and ear canal normal. Tympanic membrane is not perforated, not erythematous and not bulging.   Nose: Nose normal.   Mouth/Throat: Uvula is midline, oropharynx is clear and moist and mucous membranes are normal.   Eyes: Conjunctivae and EOM are normal.   Neck: Neck supple.   Normal range of motion.   Full passive range of motion without pain.     Cardiovascular:  Normal rate and regular rhythm.           Pulses:       Radial pulses are 2+ on the right side and 2+ on the left side.   Pulmonary/Chest: Effort normal and breath sounds normal. No accessory muscle usage. No respiratory distress. She has no decreased breath sounds.   Abdominal: Abdomen is soft. Bowel sounds are normal. She exhibits no distension. There is no abdominal tenderness. There is no rebound and no guarding.   Musculoskeletal:         General: Normal range of motion.      Cervical back: Full passive range of motion without pain, normal range of motion and neck supple. No rigidity.     Neurological: She is alert. No cranial nerve deficit.   Neuro intact.  Strength and sensation intact bilateral upper and lower extremities.   Skin: Skin is warm and dry.   Psychiatric: She has a normal mood and affect.         ED Course   Procedures  Labs Reviewed   POCT INFLUENZA A/B MOLECULAR   SARS-COV-2 RDRP GENE   POCT STREP A MOLECULAR   POCT URINE PREGNANCY          Imaging Results    None          Medications - No data to display  Medical Decision Making  This is a 38-year-old female with no past medical history presents to ED complaining of  nonproductive cough, chills, rhinorrhea, sore throat, generalized myalgias that started 3 days ago.On physical exam, patient is well-appearing and in no acute distress.  Nontoxic appearing.  Lungs are clear to auscultation bilaterally.  Abdomen is soft and nontender.  No guarding, rigidity, rebound.  2+ radial pulses bilaterally.  Posterior oropharynx is not erythematous.  No edema or exudate.  Uvula midline.  Bilateral tympanic membrane is normal.  No erythema, bulging, or perforations.  Neuro intact.  Strength and sensation intact bilateral upper and lower extremities.  Full range motion of neck.  No neck rigidity.  UPT negative.  COVID, flu, strep negative.  Believe patient's symptoms are viral in nature.  Will discharge patient on Tylenol, ibuprofen, Chloraseptic throat spray, and Robitussin.  Encouraged the patient to drink lot of fluids upon discharge.  Urged prompt follow-up with PCP for further evaluation.    Strict return precautions given. I discussed with the patient/family the diagnosis, treatment plan, indications for return to the emergency department, and for expected follow-up. The patient/family verbalized an understanding. The patient/family is asked if there are any questions or concerns. We discuss the case, until all issues are addressed to the patient/family's satisfaction. Patient/family understands and is agreeable to the plan. Patient is stable and ready for discharge.      Amount and/or Complexity of Data Reviewed  Labs: ordered.    Risk  OTC drugs.  Prescription drug management.                               Clinical Impression:   Final diagnoses:  [B34.9] Viral syndrome (Primary)        ED Disposition Condition    Discharge Stable          ED Prescriptions       Medication Sig Dispense Start Date End Date Auth. Provider    acetaminophen (TYLENOL) 500 MG tablet Take 1 tablet (500 mg total) by mouth every 4 (four) hours as needed for Pain or Temperature greater than (100.5 or greater). 30  tablet 10/5/2023 -- Anni Johnson PA-C    ibuprofen (ADVIL,MOTRIN) 400 MG tablet Take 1 tablet (400 mg total) by mouth every 6 (six) hours as needed for Other or Temperature greater than (pain or temperature of 100.5 or greater). 30 tablet 10/5/2023 -- Anni Johnson PA-C    phenoL (CHLORASEPTIC THROAT SPRAY) 1.4 % SprA by Mucous Membrane route every 2 (two) hours. 177 mL 10/5/2023 -- Anni Johnson PA-C    guaiFENesin 100 mg/5 ml (ROBITUSSIN) 100 mg/5 mL syrup Take 10 mLs (200 mg total) by mouth every 4 (four) hours as needed for Cough. 118 mL 10/5/2023 10/15/2023 Anni Johnson PA-C          Follow-up Information       Follow up With Specialties Details Why Contact Info    [facility] -  Schedule an appointment as soon as possible for a visit in 2 days for further evaluation [address] [place] [address]  [phone]      [facility] - Emergency Dept Emergency Medicine In 2 days If symptoms worsen [address]  [facility] [address] [phone]             Anni Johnson PA-C  10/05/23 2055

## 2023-11-14 ENCOUNTER — HOSPITAL ENCOUNTER (EMERGENCY)
Facility: HOSPITAL | Age: 38
Discharge: HOME | End: 2023-11-14
Attending: EMERGENCY MEDICINE
Payer: MEDICAID

## 2023-11-14 VITALS
RESPIRATION RATE: 18 BRPM | DIASTOLIC BLOOD PRESSURE: 78 MMHG | BODY MASS INDEX: 24.84 KG/M2 | SYSTOLIC BLOOD PRESSURE: 148 MMHG | HEART RATE: 89 BPM | HEIGHT: 62 IN | WEIGHT: 135 LBS | OXYGEN SATURATION: 100 % | TEMPERATURE: 99 F

## 2023-11-14 DIAGNOSIS — J06.9 VIRAL URI WITH COUGH: Primary | ICD-10-CM

## 2023-11-14 DIAGNOSIS — M25.519 SHOULDER PAIN: ICD-10-CM

## 2023-11-14 DIAGNOSIS — R07.9 CHEST PAIN: ICD-10-CM

## 2023-11-14 LAB
B-HCG UR QL: NEGATIVE
CTP QC/QA: YES
POC MOLECULAR INFLUENZA A AGN: NEGATIVE
POC MOLECULAR INFLUENZA B AGN: NEGATIVE
SARS-COV-2 RDRP RESP QL NAA+PROBE: NEGATIVE

## 2023-11-14 PROCEDURE — 63600175 PHARM REV CODE 636 W HCPCS: Performed by: PHYSICIAN ASSISTANT

## 2023-11-14 PROCEDURE — 96372 THER/PROPH/DIAG INJ SC/IM: CPT | Performed by: PHYSICIAN ASSISTANT

## 2023-11-14 PROCEDURE — 99284 EMERGENCY DEPT VISIT MOD MDM: CPT | Mod: 25

## 2023-11-14 PROCEDURE — 87635 SARS-COV-2 COVID-19 AMP PRB: CPT | Performed by: INTERNAL MEDICINE

## 2023-11-14 PROCEDURE — 93005 ELECTROCARDIOGRAM TRACING: CPT

## 2023-11-14 PROCEDURE — 93010 ELECTROCARDIOGRAM REPORT: CPT | Mod: ,,, | Performed by: INTERNAL MEDICINE

## 2023-11-14 PROCEDURE — 81025 URINE PREGNANCY TEST: CPT | Performed by: INTERNAL MEDICINE

## 2023-11-14 PROCEDURE — 87502 INFLUENZA DNA AMP PROBE: CPT

## 2023-11-14 PROCEDURE — 93010 EKG 12-LEAD: ICD-10-PCS | Mod: ,,, | Performed by: INTERNAL MEDICINE

## 2023-11-14 RX ORDER — KETOROLAC TROMETHAMINE 30 MG/ML
30 INJECTION, SOLUTION INTRAMUSCULAR; INTRAVENOUS
Status: COMPLETED | OUTPATIENT
Start: 2023-11-14 | End: 2023-11-14

## 2023-11-14 RX ORDER — GUAIFENESIN 100 MG/5ML
100-200 SOLUTION ORAL EVERY 4 HOURS PRN
Qty: 60 ML | Refills: 0 | Status: SHIPPED | OUTPATIENT
Start: 2023-11-14 | End: 2023-11-24

## 2023-11-14 RX ADMIN — KETOROLAC TROMETHAMINE 30 MG: 30 INJECTION, SOLUTION INTRAMUSCULAR; INTRAVENOUS at 10:11

## 2023-11-14 NOTE — Clinical Note
"Ky Law" Wilber was seen and treated in our emergency department on 11/14/2023.  She may return to work on 11/17/2023.       If you have any questions or concerns, please don't hesitate to call.      Eva Ng PA-C"

## 2023-11-15 NOTE — ED PROVIDER NOTES
Encounter Date: 11/14/2023    SCRIBE #1 NOTE: I, Azar Pop, am scribing for, and in the presence of,  MATTHEW Degroot. I have scribed the following portions of the note - Other sections scribed: HPI, ROS.       History     Chief Complaint   Patient presents with    Cough    Shortness of Breath    Chest Pain     Pt presents to ER with c/o CP, SOB and cough since yesterday. Pt states she took Dayquil and Nyquil and has had no relief. Pt rates CP 5/10 at this time. Pt states her chest hurts when she inhales/coughs. Cough is very productive. EKG unremarkable.      CC: Cough    HPI: Ky Merino is a 38 y.o. female who presents to the ED for evaluation of productive cough with yellow sputum onset yesterday. Pt complains of associated rhinorrhea, decreased appetite, dyspnea, and sharp chest pain with coughing. Pt endorses taking Nightquil and Dayquil for her symptoms. Pt denies any known sick contacts. Pt reports smoking cigarettes, a pack every 4 days for the past 3 years. Pt denies any aggravating/alleviating factors. Pt denies nausea, vomiting, diarrhea, or any other associated symptoms. Pt endorses medication compliance with blood pressure medications but denies taking any today. Pt denies any known allergies.     The history is provided by the patient. No  was used.     Review of patient's allergies indicates:  No Known Allergies  No past medical history on file.  No past surgical history on file.  No family history on file.  Social History     Tobacco Use    Smoking status: Every Day     Types: Cigarettes    Smokeless tobacco: Never   Substance Use Topics    Alcohol use: Yes     Comment: social    Drug use: No     Review of Systems   Constitutional:  Positive for appetite change. Negative for chills and fever.   HENT:  Positive for rhinorrhea. Negative for congestion, ear pain, sore throat and trouble swallowing.    Eyes:  Negative for pain, discharge and redness.   Respiratory:   Positive for cough and shortness of breath.    Cardiovascular:  Positive for chest pain.   Gastrointestinal:  Negative for abdominal pain, diarrhea, nausea and vomiting.   Genitourinary:  Negative for decreased urine volume and dysuria.   Musculoskeletal:  Negative for back pain, neck pain and neck stiffness.   Skin:  Negative for rash.   Neurological:  Negative for dizziness, weakness, light-headedness, numbness and headaches.   Psychiatric/Behavioral:  Negative for confusion.    All other systems reviewed and are negative.      Physical Exam     Initial Vitals [11/14/23 2054]   BP Pulse Resp Temp SpO2   (!) 148/85 98 20 98.9 °F (37.2 °C) 97 %      MAP       --         Physical Exam    Nursing note and vitals reviewed.  Constitutional: She appears well-developed and well-nourished. She is not diaphoretic. No distress.   HENT:   Head: Normocephalic and atraumatic.   Nose: Nose normal.   Eyes: EOM are normal. Pupils are equal, round, and reactive to light.   Neck: Neck supple.   Normal range of motion.  Cardiovascular:  Normal rate and regular rhythm.           No murmur heard.  Pulmonary/Chest: Breath sounds normal. No respiratory distress. She has no wheezes. She has no rhonchi. She has no rales.   The patient is noted coughing   Abdominal: Abdomen is soft. Bowel sounds are normal. She exhibits no distension. There is no abdominal tenderness. There is no rebound and no guarding.   Musculoskeletal:         General: No tenderness or edema. Normal range of motion.      Cervical back: Normal range of motion and neck supple.     Neurological: She is alert and oriented to person, place, and time. No cranial nerve deficit.   Skin: Skin is warm. No rash noted. No erythema.         ED Course   Procedures  Labs Reviewed   POCT URINE PREGNANCY   SARS-COV-2 RDRP GENE    Narrative:     This test utilizes isothermal nucleic acid amplification technology to detect the SARS-CoV-2 RdRp nucleic acid segment. The analytical  sensitivity (limit of detection) is 500 copies/swab.     A POSITIVE result is indicative of the presence of SARS-CoV-2 RNA; clinical correlation with patient history and other diagnostic information is necessary to determine patient infection status.    A NEGATIVE result means that SARS-CoV-2 nucleic acids are not present above the limit of detection. A NEGATIVE result should be treated as presumptive. It does not rule out the possibility of COVID-19 and should not be the sole basis for treatment decisions. If COVID-19 is strongly suspected based on clinical and exposure history, re-testing using an alternate molecular assay should be considered.     This test is only for use under the Food and Drug Administration s Emergency Use Authorization (EUA).     Commercial kits are provided by [employer]. Performance characteristics of the EUA have been independently verified by [facility] Department of Pathology and Laboratory Medicine.   _________________________________________________________________   The authorized Fact Sheet for Healthcare Providers and the authorized Fact Sheet for Patients of the ID NOW COVID-19 are available on the FDA website:    https://www.fda.gov/media/311044/download      https://www.fda.gov/media/743239/download      POCT INFLUENZA A/B MOLECULAR     EKG Readings: (Independently Interpreted)   Initial Reading: No STEMI. Rhythm: Sinus Tachycardia. Heart Rate: 101. Ectopy: No Ectopy.       Imaging Results              X-Ray Shoulder Complete 2 View Right (Final result)  Result time 11/14/23 22:30:27      Final result by Usman Jeffers MD (11/14/23 22:30:27)                   Impression:      No acute osseous abnormality identified.      Electronically signed by: Usman Jeffers MD  Date:    11/14/2023  Time:    22:30               Narrative:    EXAMINATION:  XR SHOULDER COMPLETE 2 OR MORE VIEWS RIGHT    CLINICAL HISTORY:  Pain in unspecified shoulder    TECHNIQUE:  Three  views of the right shoulder were performed.    COMPARISON:  None    FINDINGS:  No evidence of acute displaced fracture, dislocation, or osseous destructive process.                                       X-Ray Chest AP Portable (Final result)  Result time 11/14/23 21:56:33      Final result by Usman Jeffers MD (11/14/23 21:56:33)                   Impression:      No acute cardiopulmonary process identified.      Electronically signed by: Usman Jeffers MD  Date:    11/14/2023  Time:    21:56               Narrative:    EXAMINATION:  XR CHEST AP PORTABLE    CLINICAL HISTORY:  Chest pain, unspecified    TECHNIQUE:  Single frontal view of the chest was performed.    COMPARISON:  June 2022.    FINDINGS:  Cardiac silhouette is normal in size.  Lungs are symmetrically expanded.  No evidence of focal consolidative process, pneumothorax, or significant pleural effusion.  No acute osseous abnormality identified.                                    X-Rays:   Independently Interpreted Readings:   Other Readings:  Chest x-ray reveals no acute cardiopulmonary processes.    Medications   ketorolac injection 30 mg (30 mg Intramuscular Given 11/14/23 2204)     Medical Decision Making  Amount and/or Complexity of Data Reviewed  Radiology: ordered.    Risk  OTC drugs.  Prescription drug management.         APC / Resident Notes:   This is an urgent evaluation of a 30-year-old female presents to the emergency department complaining of cough.      Rapid COVID and influenza test were performed which were negative.  Urine pregnancy test was negative.  Chest x-ray revealed no acute cardiopulmonary processes.  I will treat her for a viral URI with Robitussin.  Return precautions reviewed.  I carefully considered but doubt pneumonia, COVID, flu, meningitis, strep pharyngitis.  She is currently safe and stable for discharge at this time.     Scribe Attestation:   Scribe #1: I performed the above scribed service and the documentation  accurately describes the services I performed. I attest to the accuracy of the note.                      Scribe attestation: I, Eva Ng PA-C, personally performed the services described in this documentation.  All medical record entries made by the scribe were at my direction and in my presence.  I have reviewed the chart and agree that the record reflects my personal performance and is accurate and complete.    Clinical Impression:   Final diagnoses:  [R07.9] Chest pain  [M25.519] Shoulder pain  [J06.9] Viral URI with cough (Primary)        ED Disposition Condition    Discharge Stable          ED Prescriptions       Medication Sig Dispense Start Date End Date Auth. Provider    guaiFENesin 100 mg/5 ml (ROBITUSSIN) 100 mg/5 mL syrup Take 5-10 mLs (100-200 mg total) by mouth every 4 (four) hours as needed for Cough. 60 mL 11/14/2023 11/24/2023 Eva Ng PA-C          Follow-up Information    None          Eva Ng PA-C  11/14/23 2508       Eva Ng PA-C  11/14/23 0409

## 2023-11-15 NOTE — ED NOTES
Ky Merino, a 38 y.o. female presents to the ED w/ complaint of pain r/t cough x 2 days. Patient denies CP, SOB, N/V/D, fever